# Patient Record
Sex: FEMALE | Race: BLACK OR AFRICAN AMERICAN | NOT HISPANIC OR LATINO | Employment: FULL TIME | URBAN - METROPOLITAN AREA
[De-identification: names, ages, dates, MRNs, and addresses within clinical notes are randomized per-mention and may not be internally consistent; named-entity substitution may affect disease eponyms.]

---

## 2022-08-06 ENCOUNTER — HOSPITAL ENCOUNTER (EMERGENCY)
Facility: HOSPITAL | Age: 57
Discharge: HOME | End: 2022-08-06
Payer: MEDICAID

## 2022-08-06 VITALS
SYSTOLIC BLOOD PRESSURE: 154 MMHG | WEIGHT: 190 LBS | RESPIRATION RATE: 20 BRPM | TEMPERATURE: 98 F | DIASTOLIC BLOOD PRESSURE: 80 MMHG | HEIGHT: 66 IN | BODY MASS INDEX: 30.53 KG/M2 | HEART RATE: 69 BPM | OXYGEN SATURATION: 98 %

## 2022-08-06 DIAGNOSIS — R42 LIGHTHEADEDNESS: Primary | ICD-10-CM

## 2022-08-06 DIAGNOSIS — R61 DIAPHORESIS: ICD-10-CM

## 2022-08-06 DIAGNOSIS — T67.9XXA HEAT EXPOSURE, INITIAL ENCOUNTER: ICD-10-CM

## 2022-08-06 DIAGNOSIS — E87.6 HYPOKALEMIA: ICD-10-CM

## 2022-08-06 LAB
ALBUMIN SERPL BCP-MCNC: 4.3 G/DL (ref 3.5–5.2)
ALP SERPL-CCNC: 105 U/L (ref 38–126)
ALT SERPL W/O P-5'-P-CCNC: 26 U/L (ref 10–44)
ANION GAP SERPL CALC-SCNC: 12 MMOL/L (ref 8–16)
AST SERPL-CCNC: 26 U/L (ref 15–46)
BASOPHILS # BLD AUTO: 0.02 K/UL (ref 0–0.2)
BASOPHILS NFR BLD: 0.5 % (ref 0–1.9)
BILIRUB SERPL-MCNC: 0.4 MG/DL (ref 0.1–1)
CALCIUM SERPL-MCNC: 8.6 MG/DL (ref 8.7–10.5)
CHLORIDE SERPL-SCNC: 106 MMOL/L (ref 95–110)
CO2 SERPL-SCNC: 23 MMOL/L (ref 23–29)
CREAT SERPL-MCNC: 1.34 MG/DL (ref 0.5–1.4)
DIFFERENTIAL METHOD: NORMAL
EOSINOPHIL # BLD AUTO: 0 K/UL (ref 0–0.5)
EOSINOPHIL NFR BLD: 0 % (ref 0–8)
ERYTHROCYTE [DISTWIDTH] IN BLOOD BY AUTOMATED COUNT: 11.9 % (ref 11.5–14.5)
EST. GFR  (NO RACE VARIABLE): 46.5 ML/MIN/1.73 M^2
GLUCOSE SERPL-MCNC: 115 MG/DL (ref 70–110)
HCT VFR BLD AUTO: 43.2 % (ref 37–48.5)
HGB BLD-MCNC: 14.2 G/DL (ref 12–16)
IMM GRANULOCYTES # BLD AUTO: 0.02 K/UL (ref 0–0.04)
IMM GRANULOCYTES NFR BLD AUTO: 0.5 % (ref 0–0.5)
LYMPHOCYTES # BLD AUTO: 1.6 K/UL (ref 1–4.8)
LYMPHOCYTES NFR BLD: 39.9 % (ref 18–48)
MCH RBC QN AUTO: 30.2 PG (ref 27–31)
MCHC RBC AUTO-ENTMCNC: 32.9 G/DL (ref 32–36)
MCV RBC AUTO: 92 FL (ref 82–98)
MONOCYTES # BLD AUTO: 0.5 K/UL (ref 0.3–1)
MONOCYTES NFR BLD: 11.2 % (ref 4–15)
NEUTROPHILS # BLD AUTO: 2 K/UL (ref 1.8–7.7)
NEUTROPHILS NFR BLD: 47.9 % (ref 38–73)
NRBC BLD-RTO: 0 /100 WBC
PLATELET # BLD AUTO: 240 K/UL (ref 150–450)
PMV BLD AUTO: 10.1 FL (ref 9.2–12.9)
POTASSIUM SERPL-SCNC: 3.1 MMOL/L (ref 3.5–5.1)
PROT SERPL-MCNC: 8 G/DL (ref 6–8.4)
RBC # BLD AUTO: 4.7 M/UL (ref 4–5.4)
SODIUM SERPL-SCNC: 141 MMOL/L (ref 136–145)
TROPONIN I SERPL-MCNC: <0.012 NG/ML (ref 0.01–0.03)
UUN UR-MCNC: 24 MG/DL (ref 7–17)
WBC # BLD AUTO: 4.09 K/UL (ref 3.9–12.7)

## 2022-08-06 PROCEDURE — 94760 N-INVAS EAR/PLS OXIMETRY 1: CPT | Mod: ER

## 2022-08-06 PROCEDURE — 80053 COMPREHEN METABOLIC PANEL: CPT | Mod: ER | Performed by: EMERGENCY MEDICINE

## 2022-08-06 PROCEDURE — 99284 EMERGENCY DEPT VISIT MOD MDM: CPT | Mod: 25,ER

## 2022-08-06 PROCEDURE — 93010 ELECTROCARDIOGRAM REPORT: CPT | Mod: ,,, | Performed by: INTERNAL MEDICINE

## 2022-08-06 PROCEDURE — 25000003 PHARM REV CODE 250: Mod: ER | Performed by: PHYSICIAN ASSISTANT

## 2022-08-06 PROCEDURE — 93005 ELECTROCARDIOGRAM TRACING: CPT | Mod: ER

## 2022-08-06 PROCEDURE — 93010 EKG 12-LEAD: ICD-10-PCS | Mod: ,,, | Performed by: INTERNAL MEDICINE

## 2022-08-06 PROCEDURE — 84484 ASSAY OF TROPONIN QUANT: CPT | Mod: ER | Performed by: PHYSICIAN ASSISTANT

## 2022-08-06 PROCEDURE — 25000003 PHARM REV CODE 250: Mod: ER | Performed by: EMERGENCY MEDICINE

## 2022-08-06 PROCEDURE — 99900035 HC TECH TIME PER 15 MIN (STAT): Mod: ER

## 2022-08-06 PROCEDURE — 85025 COMPLETE CBC W/AUTO DIFF WBC: CPT | Mod: ER | Performed by: EMERGENCY MEDICINE

## 2022-08-06 RX ORDER — POTASSIUM CHLORIDE 20 MEQ/1
40 TABLET, EXTENDED RELEASE ORAL
Status: COMPLETED | OUTPATIENT
Start: 2022-08-06 | End: 2022-08-06

## 2022-08-06 RX ADMIN — POTASSIUM CHLORIDE 40 MEQ: 1500 TABLET, EXTENDED RELEASE ORAL at 03:08

## 2022-08-06 RX ADMIN — SODIUM CHLORIDE 1000 ML: 0.9 INJECTION, SOLUTION INTRAVENOUS at 02:08

## 2022-08-06 NOTE — DISCHARGE INSTRUCTIONS
Maintain adequate hydration and healthy diet.  Follow-up close with PCP for continued care.  Return to ED with any worsening of symptoms or condition.

## 2022-08-06 NOTE — ED PROVIDER NOTES
Encounter Date: 2022       History     Chief Complaint   Patient presents with    feels lightheaded     Pt states she was at a re-pass and started feeling lightheaded and sweating so she came to get checked out. Denies pain. Skin W/D at present time     56-year-old female presenting to ED with complaints of feeling lightheaded and diaphoretic while at a  earlier today.  Patient admits that she was standing outside in the heat for 15-20 minutes when symptoms onset.  Patient denies experiencing any chest pain, shortness of breath, headache, vision changes, dizziness, gait instability or speech difficulty.  Patient reports feeling much better now that she is resting inside in the AC.  No prior similar occurrences.  No other complaints at this time.    The history is provided by the patient. No  was used.     Review of patient's allergies indicates:  No Known Allergies  Past Medical History:   Diagnosis Date    Hypertension      No past surgical history on file.  No family history on file.     Review of Systems   Constitutional: Positive for diaphoresis. Negative for chills, fatigue and fever.   HENT: Negative for congestion, ear pain, nosebleeds, sinus pain, sore throat and trouble swallowing.    Eyes: Negative for photophobia, pain, redness and visual disturbance.   Respiratory: Negative for cough, choking, chest tightness, shortness of breath, wheezing and stridor.    Cardiovascular: Negative for chest pain, palpitations and leg swelling.   Gastrointestinal: Negative for abdominal pain, diarrhea, nausea and vomiting.   Endocrine: Negative.    Genitourinary: Negative for decreased urine volume, difficulty urinating, dysuria, flank pain, hematuria and urgency.   Musculoskeletal: Negative for back pain, myalgias and neck pain.   Skin: Negative.    Allergic/Immunologic: Negative.    Neurological: Positive for light-headedness. Negative for dizziness, tremors, seizures, syncope, speech  difficulty, weakness, numbness and headaches.   Hematological: Negative.    Psychiatric/Behavioral: Negative.    All other systems reviewed and are negative.      Physical Exam     Initial Vitals [08/06/22 1428]   BP Pulse Resp Temp SpO2   (!) 138/97 85 17 98.2 °F (36.8 °C) 97 %      MAP       --         Physical Exam    Nursing note and vitals reviewed.  Constitutional: Vital signs are normal. She appears well-developed and well-nourished. She is not diaphoretic. No distress.   56-year-old female sitting upright in bed in no acute distress, nontoxic, alert and oriented, breathing comfortably on room air, responding appropriately to questioning   HENT:   Head: Normocephalic and atraumatic.   Right Ear: Hearing and external ear normal.   Left Ear: Hearing and external ear normal.   Nose: Nose normal.   Mouth/Throat: Uvula is midline.   Equal symmetric facial movements, no droop   Eyes: Conjunctivae and EOM are normal. Pupils are equal, round, and reactive to light.   Pupils 5 mm equal round reactive bilaterally, extraocular motions fully intact, vision unchanged   Neck: Trachea normal. Neck supple.   Normal range of motion.  Cardiovascular: Normal rate, regular rhythm, normal heart sounds, intact distal pulses and normal pulses.   Pulmonary/Chest: Effort normal and breath sounds normal. No accessory muscle usage. No tachypnea. No respiratory distress. She has no decreased breath sounds. She has no wheezes.   Respirations even and unlabored   Abdominal: Abdomen is soft. Bowel sounds are normal. She exhibits no distension. There is no abdominal tenderness. There is no rigidity, no rebound and no guarding.   Musculoskeletal:         General: Normal range of motion.      Cervical back: Normal range of motion and neck supple.     Neurological: She is alert and oriented to person, place, and time. She has normal strength. She displays no tremor. No sensory deficit. She exhibits normal muscle tone. She displays no seizure  activity. Coordination normal. GCS score is 15. GCS eye subscore is 4. GCS verbal subscore is 5. GCS motor subscore is 6.   Fully intact neurovascular exam   Skin: Skin is warm and dry. Capillary refill takes less than 2 seconds.         ED Course   Procedures  Labs Reviewed   COMPREHENSIVE METABOLIC PANEL - Abnormal; Notable for the following components:       Result Value    Potassium 3.1 (*)     Glucose 115 (*)     BUN 24 (*)     Calcium 8.6 (*)     eGFR 46.5 (*)     All other components within normal limits   CBC W/ AUTO DIFFERENTIAL   TROPONIN I     EKG Readings: (Independently Interpreted)   Initial Reading: No STEMI.   EKG done at 2:33 p.m., rate 69, , QRS 86, normal sinus rhythm, no STEMI present       Imaging Results          X-Ray Chest AP Portable (Final result)  Result time 08/06/22 15:17:13    Final result by Edin Jones MD (08/06/22 15:17:13)                 Impression:      No acute abnormality.      Electronically signed by: Edin Jones  Date:    08/06/2022  Time:    15:17             Narrative:    EXAMINATION:  XR CHEST AP PORTABLE    CLINICAL HISTORY:  fatigue;    TECHNIQUE:  Single frontal view of the chest was performed.    COMPARISON:  Left ribs radiograph 04/18/2022    FINDINGS:  The lungs are clear, with normal appearance of pulmonary vasculature and no pleural effusion or pneumothorax.    The cardiac silhouette is normal in size. The hilar and mediastinal contours are unremarkable.    Bones are intact.                                 Medications   potassium chloride SA CR tablet 40 mEq (has no administration in time range)   sodium chloride 0.9% bolus 1,000 mL (1,000 mLs Intravenous New Bag 8/6/22 1438)                 ED Course as of 08/06/22 1541   Sat Aug 06, 2022   1507 Potassium(!): 3.1 [MC]      ED Course User Index  [MC] Isadora Angeles PA-C             Clinical Impression:   Final diagnoses:  [R61] Diaphoresis  [R42] Lightheadedness (Primary)  [E87.6]  Hypokalemia  [T67.9XXA] Heat exposure, initial encounter          ED Disposition Condition    Discharge Stable        ED Prescriptions     None        Follow-up Information     Follow up With Specialties Details Why Contact Info    PRIMARY CARE MD  Schedule an appointment as soon as possible for a visit in 3 days If symptoms worsen     [facility] - Emergency Dept Emergency Medicine Go to  [facility] symptoms worsen [address] [facility] [address] [phone]        PATIENT SEEN BY NIGEL ONLY.    Discussed care plan patient.  Discussed reassuring and overall unremarkable workup.  Minimal hypokalemia which was orally repleted in the ER.  Patient given IV fluid bolus doses.  Vital signs stable on arrival and continued throughout ED stay.  Patient on arrival essentially asymptomatic which continued to fully resolved by discharge.  Normal steady gait, neurovascular intact, suspect heat exposure other educated on the importance of close PCP follow-up as well as ED return precautions.  Patient is stable, all questions answered and ready for discharge.     Isadora Angeles PA-C  08/06/22 1549

## 2023-04-13 ENCOUNTER — HOSPITAL ENCOUNTER (EMERGENCY)
Facility: HOSPITAL | Age: 58
Discharge: HOME | End: 2023-04-13
Attending: EMERGENCY MEDICINE
Payer: MEDICAID

## 2023-04-13 VITALS
WEIGHT: 190 LBS | RESPIRATION RATE: 18 BRPM | SYSTOLIC BLOOD PRESSURE: 184 MMHG | DIASTOLIC BLOOD PRESSURE: 97 MMHG | OXYGEN SATURATION: 98 % | HEART RATE: 84 BPM | HEIGHT: 66 IN | BODY MASS INDEX: 30.53 KG/M2 | TEMPERATURE: 99 F

## 2023-04-13 DIAGNOSIS — L29.9 PRURITUS: Primary | ICD-10-CM

## 2023-04-13 DIAGNOSIS — G89.29 CHRONIC BACK PAIN, UNSPECIFIED BACK LOCATION, UNSPECIFIED BACK PAIN LATERALITY: ICD-10-CM

## 2023-04-13 DIAGNOSIS — M54.9 CHRONIC BACK PAIN, UNSPECIFIED BACK LOCATION, UNSPECIFIED BACK PAIN LATERALITY: ICD-10-CM

## 2023-04-13 PROCEDURE — 99282 EMERGENCY DEPT VISIT SF MDM: CPT | Mod: ER

## 2023-04-13 NOTE — ED PROVIDER NOTES
Encounter Date: 4/13/2023       History     Chief Complaint   Patient presents with    Breast Pain     C/o rash and burning sensation under bilat breast. Denies any discharge or fever     57-year-old female with history of hypertension presenting with 1 week of itching beneath her right breast.  She reports some burning as well.  She says that she has occasional burning beneath her left breast as well.  She complains of chronic back pain for the past 6 years.  She denies nipple discharge or palpable mass.  No fever.  Patient has not used any treatments prior to arrival.    Review of patient's allergies indicates:  No Known Allergies  Past Medical History:   Diagnosis Date    Hypertension      No past surgical history on file.  No family history on file.     Review of Systems   Constitutional:  Negative for fever.   Respiratory:  Negative for shortness of breath.    Skin:         Itching/burning beneath breasts     Physical Exam     Initial Vitals [04/13/23 1543]   BP Pulse Resp Temp SpO2   (!) 184/97 84 18 98.6 °F (37 °C) 98 %      MAP       --         Physical Exam    Nursing note and vitals reviewed.  HENT:   Head: Atraumatic.   Pulmonary/Chest: No respiratory distress.     Neurological: She is alert and oriented to person, place, and time. No cranial nerve deficit.   Skin:   Breast exam performed with Theresa SNYDER chaperone.  No palpable masses.  No nipple discoloration.  No nipple discharge.  No axillary lymphadenopathy.  No rash beneath the breasts.       ED Course   Procedures  Labs Reviewed - No data to display       Imaging Results    None          Medications - No data to display  Medical Decision Making:   Initial Assessment:   57-year-old female presenting with itching beneath her breasts for the past week.  Exam is unremarkable.  No evidence of infection.  No clear evidence of fungal infection, abscess/cellulitis.  No obvious palpable masses.  Etiology of patient's symptoms unclear at this time.  Patient  says she has had a mammogram within the last year and that it was normal.  Recommend patient try and keep the area beneath her breasts dry with barrier creams or baby powder.  Of note, patient did say that she had a mole near her right breast but she is unsure how long it has been there.  Will refer to Dermatology for further evaluation.  Patient should also be evaluated by primary care/Gynecology for further evaluation of breast itching.                        Clinical Impression:   Final diagnoses:  [L29.9] Pruritus (Primary)  [M54.9, G89.29] Chronic back pain, unspecified back location, unspecified back pain laterality        ED Disposition Condition    Discharge Stable          ED Prescriptions    None       Follow-up Information       Follow up With Specialties Details Why Contact Info    Primary care  Schedule an appointment as soon as possible for a visit in 3 days      Dermatology  Schedule an appointment as soon as possible for a visit                Larry Yap MD  04/13/23 3445

## 2023-04-13 NOTE — DISCHARGE INSTRUCTIONS
Allow area beneath breasts to get air circulation and dry out. You can also use baby powder or barrier cream for a few days.